# Patient Record
Sex: FEMALE | Race: WHITE | NOT HISPANIC OR LATINO | URBAN - METROPOLITAN AREA
[De-identification: names, ages, dates, MRNs, and addresses within clinical notes are randomized per-mention and may not be internally consistent; named-entity substitution may affect disease eponyms.]

---

## 2023-11-17 ENCOUNTER — OFFICE VISIT (OUTPATIENT)
Dept: URGENT CARE | Facility: CLINIC | Age: 43
End: 2023-11-17
Payer: COMMERCIAL

## 2023-11-17 VITALS
WEIGHT: 142 LBS | DIASTOLIC BLOOD PRESSURE: 77 MMHG | TEMPERATURE: 97.8 F | OXYGEN SATURATION: 98 % | RESPIRATION RATE: 16 BRPM | SYSTOLIC BLOOD PRESSURE: 132 MMHG | BODY MASS INDEX: 25.15 KG/M2 | HEART RATE: 79 BPM

## 2023-11-17 DIAGNOSIS — R50.9 FEBRILE ILLNESS: ICD-10-CM

## 2023-11-17 DIAGNOSIS — J02.0 STREP PHARYNGITIS: Primary | ICD-10-CM

## 2023-11-17 DIAGNOSIS — J02.9 SORE THROAT: ICD-10-CM

## 2023-11-17 LAB — POC RAPID STREP: POSITIVE

## 2023-11-17 PROCEDURE — 99214 OFFICE O/P EST MOD 30 MIN: CPT | Performed by: FAMILY MEDICINE

## 2023-11-17 PROCEDURE — 1036F TOBACCO NON-USER: CPT | Performed by: FAMILY MEDICINE

## 2023-11-17 PROCEDURE — 87880 STREP A ASSAY W/OPTIC: CPT | Performed by: FAMILY MEDICINE

## 2023-11-17 RX ORDER — AMOXICILLIN 875 MG/1
875 TABLET, FILM COATED ORAL 2 TIMES DAILY
Qty: 20 TABLET | Refills: 0 | Status: SHIPPED | OUTPATIENT
Start: 2023-11-17 | End: 2023-11-27

## 2023-11-17 ASSESSMENT — ENCOUNTER SYMPTOMS
DIARRHEA: 0
VOMITING: 0
CHILLS: 1
CHEST TIGHTNESS: 0
FEVER: 1
ABDOMINAL PAIN: 0
TROUBLE SWALLOWING: 1
ADENOPATHY: 1
RHINORRHEA: 0
SORE THROAT: 1
HEADACHES: 1
FREQUENCY: 0
SINUS PRESSURE: 0
DYSURIA: 0
NAUSEA: 1
CONSTIPATION: 0
WHEEZING: 0
PALPITATIONS: 0
COUGH: 1
SHORTNESS OF BREATH: 0

## 2023-11-17 NOTE — PATIENT INSTRUCTIONS
You have strep throat. This is caused by a bacterial infection in your throat. Symptoms include sore throat, difficulty swallowing, swollen ,tender lymph nodes in the neck, fever, headache, nausea and occasionally vomiting, Rarely there can also be a rash.  Please take Amoxicillin as prescribed. I recommend using probiotics while taking the antibiotic and for 7-10 days following completion of the course. Please ask pharmacist for advice about appropriate product for this purpose.  Please take all of the antibiotics as prescribed even though you will feel better before they are completely gone.  After 24 hours of antibiotic treatment your generally not contagious to others and may return to work or school activities.  After 2 days of antibiotics, discard your toothbrush and replace with a new one to avoid possible reinfection when you finish your antibiotics  Please increase your oral fluids for the next 7-10 days  You may mix 1 teaspoon of table salt with 8 ounces of warm water to rinse and gargle your sore throat.  You may do this repeatedly for up to 15 minutes if it seems to relieve your discomfort.  Do not swallow this liquid  May take Tylenol (acetaminophen) 325 mg, 2 tablets by mouth every 4-6 hours as needed for fever or discomfort. May take Motrin or Advil (ibuprofen) 200 mg, 2 tablets by mouth every 8 hours as needed for fever or discomfort   You can take ibuprofen with acetaminophen.  Please follow-up with your primary care provider if no improvement in 5-7 days  If fever greater than 102 degrees Fahrenheit, chills, nausea, vomiting, difficulty swallowing, difficulty breathing, shortness of breath, feeling of your throat closing off call 911 and please go immediately to the nearest emergency department for further evaluation.  This note was generated by voice recognition software. Minor transcription/grammatical errors may be present. Please call for clarification.       Niacinamide Pregnancy And Lactation Text: These medications are considered safe during pregnancy.

## 2023-11-17 NOTE — PROGRESS NOTES
Subjective   Patient ID: Patti Jiménez is a 43 y.o. female.     43-year-old  female presents with complaint of sore throat and nighttime fevers.      History provided by:  Patient      The following portions of the chart were reviewed this encounter and updated as appropriate:  Tobacco  Allergies  Meds  Problems  Med Hx  Surg Hx  Fam Hx         Review of Systems   Constitutional:  Positive for chills and fever.   HENT:  Positive for ear pain, sore throat and trouble swallowing. Negative for congestion, rhinorrhea and sinus pressure.    Respiratory:  Positive for cough. Negative for chest tightness, shortness of breath and wheezing.    Cardiovascular:  Negative for palpitations.   Gastrointestinal:  Positive for nausea. Negative for abdominal pain, constipation, diarrhea and vomiting.   Genitourinary:  Negative for dysuria and frequency.   Neurological:  Positive for headaches.   Hematological:  Positive for adenopathy.     Objective   Physical Exam vital signs are reviewed. Alert and oriented x3 with normal mood and affect  Patient is well nourished, well-developed, alert and in no acute distress    External eyes, orbits, conjunctiva and eyelids are normal in appearance  Pupils are equal, round, reactive to light and accommodation, extraocular movements intact    External ears appear normal  External canals are normal in appearance  Right tympanic membrane is intact and pale gray in appearance  Left tympanic membrane is intact and pale gray in appearance  There is no middle ear effusion noted on the right  There is no middle ear effusion noted on the left  External appearance of the nose is normal  Nasal mucosa, septum, turbinates are  mildly reddened and moderately swollen in appearance  There is no nasal discharge in both nares    Oral mucosa is uniformly pink and moist  Palate is pink, symmetric and intact  Tongue is moist, mobile and midline  Tonsils are present,  mildly enlarged,  moderately  erythematous with no concretions or exudates present  No cervical lymphadenopathy palpated    Heart has regular rate and rhythm. No murmurs, rubs or gallops are auscultated at this exam.    Respiratory rate rhythm and effort are normal. Breath sounds bilaterally are clear on auscultation without crackles, rhonchi, wheezes or friction rub.    Abdomen: Normal bowel sounds on auscultation. Soft, nontender without rebound or rigidity on palpation          Procedures    Assessment/Plan   Diagnoses and all orders for this visit:  Strep pharyngitis  -     amoxicillin (Amoxil) 875 mg tablet; Take 1 tablet (875 mg) by mouth 2 times a day for 10 days.  Sore throat  -     POCT rapid strep A manually resulted  Febrile illness  -     amoxicillin (Amoxil) 875 mg tablet; Take 1 tablet (875 mg) by mouth 2 times a day for 10 days.